# Patient Record
Sex: FEMALE | Race: WHITE | Employment: FULL TIME | URBAN - METROPOLITAN AREA
[De-identification: names, ages, dates, MRNs, and addresses within clinical notes are randomized per-mention and may not be internally consistent; named-entity substitution may affect disease eponyms.]

---

## 2022-03-18 PROBLEM — E66.01 SEVERE OBESITY (HCC): Status: ACTIVE | Noted: 2019-03-04

## 2022-07-07 ENCOUNTER — HOSPITAL ENCOUNTER (EMERGENCY)
Dept: GENERAL RADIOLOGY | Age: 22
Discharge: HOME | End: 2022-07-10
Payer: COMMERCIAL

## 2022-07-07 ENCOUNTER — HOSPITAL ENCOUNTER (EMERGENCY)
Age: 22
Discharge: HOME | End: 2022-07-07
Payer: COMMERCIAL

## 2022-07-07 VITALS
SYSTOLIC BLOOD PRESSURE: 135 MMHG | DIASTOLIC BLOOD PRESSURE: 96 MMHG | BODY MASS INDEX: 33.74 KG/M2 | HEART RATE: 97 BPM | HEIGHT: 67 IN | RESPIRATION RATE: 18 BRPM | OXYGEN SATURATION: 98 % | WEIGHT: 215 LBS | TEMPERATURE: 98 F

## 2022-07-07 DIAGNOSIS — S92.251A CLOSED DISPLACED FRACTURE OF NAVICULAR BONE OF RIGHT FOOT, INITIAL ENCOUNTER: Primary | ICD-10-CM

## 2022-07-07 PROCEDURE — 73600 X-RAY EXAM OF ANKLE: CPT

## 2022-07-07 PROCEDURE — 99283 EMERGENCY DEPT VISIT LOW MDM: CPT

## 2022-07-07 PROCEDURE — 73620 X-RAY EXAM OF FOOT: CPT

## 2022-07-07 ASSESSMENT — ENCOUNTER SYMPTOMS
SHORTNESS OF BREATH: 0
RESPIRATORY NEGATIVE: 1
EYES NEGATIVE: 1
GASTROINTESTINAL NEGATIVE: 1

## 2022-07-07 ASSESSMENT — PAIN - FUNCTIONAL ASSESSMENT: PAIN_FUNCTIONAL_ASSESSMENT: 0-10

## 2022-07-07 NOTE — ED TRIAGE NOTES
Pt arrived via POV c/o right ankle pain after a steel tank rolled over there heel while at work about an hour ago.

## 2022-07-07 NOTE — ED PROVIDER NOTES
[facility] Emergency Department Provider Note                     PCP:                Jean Paul Hewitt MD               Age: 24 y.o. Sex: female           ICD-10-CM    1. Closed displaced fracture of navicular bone of right foot, initial encounter  S92.251A        DISPOSITION         There are no discharge medications for this patient. MDM  Number of Diagnoses or Management Options  Closed displaced fracture of navicular bone of right foot, initial encounter  Diagnosis management comments: Parental diagnosis: Ankle sprain, ankle fracture, foot sprain, foot fracture. Patient has avulsion of the navicular bone. Cortical edge make it appear not acute however the patient has significant tenderness over that spot so therefore we will treat as an acute avulsion fracture of the navicular bone. Rest ice compression elevation follow-up with orthopedics. Amount and/or Complexity of Data Reviewed  Tests in the radiology section of CPT®: ordered and reviewed    Risk of Complications, Morbidity, and/or Mortality  Presenting problems: moderate  Diagnostic procedures: moderate  Management options: moderate        Orders Placed This Encounter   Procedures    XR ANKLE RIGHT (2 VIEWS)    XR FOOT RIGHT (2 VIEWS)    CRUTCHES WITH INSTRUCTIONS    ACE Wrap, apply 3\"        Esther Layton, [address] [facility] [id number]    Schedule an appointment as soon as possible for a visit in 2 days      [facility] [phone]  Schedule an appointment as soon as possible for a visit in 2 days         Marcella Leos is a 24 y.o. female who presents to the Emergency Department with chief complaint of    Chief Complaint   Patient presents with    Foot Pain      69-year-old female complaining of right ankle and foot pain after a heavy steel tank rolled up on her ankle at work.   Patient is painful to bear weight. Patient states she has had multiple ankle sprains when she played sports. The history is provided by the patient. Foot Pain  This is a new problem. The current episode started 1 to 2 hours ago. The problem occurs constantly. The problem has not changed since onset. Pertinent negatives include no chest pain and no shortness of breath. The symptoms are aggravated by walking. The symptoms are relieved by rest. She has tried nothing for the symptoms. Review of Systems   Constitutional: Negative. Negative for activity change, appetite change, chills, fatigue and fever. HENT: Negative. Eyes: Negative. Respiratory: Negative. Negative for shortness of breath. Cardiovascular: Negative. Negative for chest pain. Gastrointestinal: Negative. Endocrine: Negative. Musculoskeletal: Negative. Neurological: Negative. Hematological: Negative. Psychiatric/Behavioral: Negative. All other systems reviewed and are negative. All other systems reviewed and are negative. No past medical history on file. No past surgical history on file. No family history on file. Social Connections:     Frequency of Communication with Friends and Family: Not on file    Frequency of Social Gatherings with Friends and Family: Not on file    Attends Oriental orthodox Services: Not on file    Active Member of Clubs or Organizations: Not on file    Attends Club or Organization Meetings: Not on file    Marital Status: Not on file        No Known Allergies     Vitals signs and nursing note reviewed. Patient Vitals for the past 4 hrs:   Temp Pulse Resp BP SpO2   07/07/22 0507 98 °F (36.7 °C) 97 18 (!) 135/96 98 %          Physical Exam  Vitals and nursing note reviewed. Constitutional:       Appearance: Normal appearance. She is not ill-appearing. HENT:      Head: Normocephalic and atraumatic.       Right Ear: External ear normal.      Left Ear: External ear normal.      Nose: Nose normal.   Eyes:      Extraocular Movements: Extraocular movements intact. Conjunctiva/sclera: Conjunctivae normal.      Pupils: Pupils are equal, round, and reactive to light. Cardiovascular:      Rate and Rhythm: Normal rate and regular rhythm. Pulses: Normal pulses. Pulmonary:      Effort: Pulmonary effort is normal. No respiratory distress. Abdominal:      General: There is no distension. Musculoskeletal:         General: No swelling or signs of injury. Normal range of motion. Cervical back: Normal range of motion. No rigidity. Right ankle: Tenderness present. Normal range of motion. Right foot: Tenderness and bony tenderness present. Legs:    Skin:     General: Skin is warm and dry. Capillary Refill: Capillary refill takes less than 2 seconds. Neurological:      General: No focal deficit present. Mental Status: She is alert and oriented to person, place, and time. Mental status is at baseline. Psychiatric:         Mood and Affect: Mood normal.         Behavior: Behavior normal.         Thought Content: Thought content normal.         Judgment: Judgment normal.          Procedures    Labs Reviewed - No data to display     XR ANKLE RIGHT (2 VIEWS)   Final Result      1. No acute fracture or dislocation. XR FOOT RIGHT (2 VIEWS)   Final Result      1. Dorsal avulsion fracture of the navicular bone, appearing old/chronic given   the corticated margins. Correlate for focal pain in this region. 2. No additional fracture is seen.                  [place] Coma Scale  Eye Opening: Spontaneous  Best Verbal Response: Oriented  Best Motor Response: Obeys commands  Cassie Coma Scale Score: 15                     [place] Assessment  BP: (!) 135/96  Heart Rate: 97                 ED Course as of 07/07/22 0753   Thu Jul 07, 2022   0600 XR FOOT RIGHT (2 VIEWS) [JH]      ED Course User Index  [JH] Doug Lara MD        Voice dictation software was used during the making of this note. This software is not perfect and grammatical and other typographical errors may be present. This note has not been completely proofread for errors.         Chryl Schilder, MD  07/07/22 6768       Chryl Schilder, MD  07/07/22 3216

## 2022-07-07 NOTE — ED TRIAGE NOTES
I have reviewed discharge instructions with the patient. The patient verbalized understanding. Patient left ED via Discharge Method: ambulatory to Home with mother. Opportunity for questions and clarification provided. Patient given 0 scripts. To continue your aftercare when you leave the hospital, you may receive an automated call from our care team to check in on how you are doing. This is a free service and part of our promise to provide the best care and service to meet your aftercare needs.  If you have questions, or wish to unsubscribe from this service please call [phone]. Thank you for Choosing our [facility] Emergency Department.

## 2022-07-28 ENCOUNTER — OFFICE VISIT (OUTPATIENT)
Dept: ORTHOPEDIC SURGERY | Age: 22
End: 2022-07-28

## 2022-07-28 VITALS — WEIGHT: 204 LBS | BODY MASS INDEX: 32.02 KG/M2 | HEIGHT: 67 IN

## 2022-07-28 DIAGNOSIS — S99.921A INJURY OF RIGHT FOOT, INITIAL ENCOUNTER: ICD-10-CM

## 2022-07-28 DIAGNOSIS — S92.251A CLOSED AVULSION FRACTURE OF NAVICULAR BONE OF RIGHT FOOT, INITIAL ENCOUNTER: ICD-10-CM

## 2022-07-28 DIAGNOSIS — S99.911A INJURY OF RIGHT ANKLE, INITIAL ENCOUNTER: ICD-10-CM

## 2022-07-28 DIAGNOSIS — M79.671 RIGHT FOOT PAIN: Primary | ICD-10-CM

## 2022-07-28 NOTE — LETTER
[address],[facility] [address]  Phone: [phone]  Fax: [phone]    Tere Tracey MD        July 28, 2022     Patient: Chelsea Macias   YOB: 2000   Date of Visit: 7/28/2022       To Whom It May Concern: It is my medical opinion that Chelsea Macias:  Out of work for 4 weeks. If you have any questions or concerns, please don't hesitate to call.     Sincerely,        Tere Tracey MD

## 2022-07-28 NOTE — PROGRESS NOTES
The patient was prescribed a walker boot for the patient's right foot. The patient wears a size 9 shoe and I fitted them with a M size boot. The patient was fitted and instructed on the use of prescribed walker boot. I explained how to fit themselves and that the plastic flexible piece should always be on the front of the boot and secured by the Velcro straps on top. The air bladder in the boot was adjusted according to proper fit and comfort. The patient walked a short distance and acknowledged satisfaction with current fit. I also explained that they need a heel lift or a higher heeled shoe for the uninvolved LE to help normalize gait and avoid excessive low back stress/strain due to leg length inequality created from walker boot. Patient read and signed documenting they understand and agree to [facility]'s current DME return policy.

## 2022-07-28 NOTE — LETTER
DME Patient Authorization Form    Name: Renée Good  : 2000  MRN: [id number]   Age: 24 y.o. Gender: female  Delivery Address: [facility]     Diagnosis:     ICD-10-CM    1. Right foot pain  M79.671 XR ANKLE RIGHT (MIN 3 VIEWS)     XR FOOT RIGHT (2 VIEWS)     Atha Saint ()     Scooter      2. Injury of right ankle, initial encounter  S99.911A Atha Saint ()     Scooter      3. Injury of right foot, initial encounter  [address] [facility] ()     Scooter      4. Closed avulsion fracture of navicular bone of right foot, initial encounter  [address] ()     Scooter           Requested DME:  Atha Saint -  ($290.00) X 1 - right        Clinical Notes:     **Indicates non-covered items by insurance. Payment expected on date of service. Electronically signed by  Provider: Antoinette Galvez. Eulalio Box MD  ______________________________ Date: 2022                             [facility] Tax ID # [id number]        Durable Medical Equipment and/or Orthotics Patient Consent     I understand that my physician has prescribed this medical supply as part of my treatment plan as a matter of Medical Necessity.  I understand that I have a choice in where I receive my prescribed orthopedic supplies and/or services.  I authorize [facility] to furnish this service/product and to provide my insurance carrier with any information requested in order to process for payment.  I instruct my insurance carrier to pay [facility] directly for these services/products.  I understand that my insurance carrier may deny payment for this supply because it is a non-covered item, deemed not medically necessary or considered experimental.   I understand that any cost not covered by my insurance carrier will be solely my financial responsibility.    I have received the Supplier Standards and have reviewed them.  I have received the prescribed item and have been fully instructed on the proper use of the above services/products.    ______ (Patient Initials) I understand that all DME items are non-returnable after being dispensed. Items still in sealed packaging may be returned up to 14 days after purchasing. [address] will replace items that are defective.    ______ (Patient Initials) I understand that [facility] will not file a claim with my insurance carrier for this service/product and I am waiving my right to file a claim on my own for this service/product with my insurance company as this item is NON-COVERED (Denoted by the **) by my Insurance company/policy. ______ (Patient Initials) I understand that I am responsible to bring my equipment to the hospital for any surgery. ______________________________________________  ________________________  Patient / [place] [facility]            Thank you for considering [address] Your physician has prescribed specific medical equipment or devices for your home use. The following describes your rights and responsibilities as our customer. Right to Choose Providers: You have a choice regarding which company supplies your home medical equipment and devices, and to consult your physician in this decision. You may choose a medical supply store, a home medical equipment provider, or a specialist such as [facility]. [facility] will coordinate with your physician to provide the medical equipment or devices prescribed for your home use. Right to Service:  You have the right to considerate, respectful and nondiscriminatory care. You have the right to receive accurate and easily understood information about your health care.   If you speak a foreign language, or don't understand the discussions, assistance will be provided to allow you to make informed health care decisions. You have the right to know your treatment options and to participate in decisions about your care, including the right to accept or refuse treatment. You have the right to expect a reasonable response to your requests for treatment or service. You have the right to talk in confidence with health care providers and to have your health care information protected. You have the right to receive an explanation of your bill. You have the right to complain about the service or product you receive. Patient Responsibilities:  Please provide complete and accurate information about your health insurance benefits and make arrangements for the timely payment of your bill. [facility] will, if possible, assume responsibility for billing your insurance (Medicare, Medicaid and commercial) for the prescribed equipment or devices. If your policy does not cover the prescribed product, or only covers a portion of the bill, you are responsible for any remaining balance. Return and Exchange Policy:  [facility] will honor published  Warranties for products. [facility] will accept returns or exchanges within 14 days from the date of receipt, providin) the product must be in new condition; 2) receipt as required; and 3) used disposable and hygiene products may only be returned due to a defective product. Note: Refunds will be issued in a timely manner, please allow 4-6 weeks for processing. Complaint Procedures and DME Consumer Protection Resources:  [facility] values you as a customer, and is committed to resolving patient concerns. This commitment includes understanding and documenting your concerns, conducting a review of internal procedures, and providing you with an explanation and resolution to your concerns.   Should you have any questions about our services or billing process, please contact our office at (practice phone number). If we are unable to resolve the concern, you have the right to direct comments to the office of Consumer Protection, in the [address] or the [facility] office, without fear of repercussion. DMEPOS SUPPLIER STANDARDS    A supplier must be in compliance with all applicable Federal and [facility] [employer] and regulatory requirements. A supplier must provide complete and accurate information on the DMEPOS supplier application. Any changes to this information must be reported to the [facility] [email or web address] within 30 days. An authorized individual (one whose signature is binding) must sign the application for billing privileges. A supplier must fill orders from its own inventory, or must contract with other companies for the purchase of items necessary to fill the order. A supplier may not contract with any entity that is currently excluded from the Medicare program, any [facility] program, or from any other Federal procurement or Nonprocurement programs. A supplier must advise beneficiaries that they may rent or purchase inexpensive or routinely purchased durable medical equipment, and of the purchase option for capped rental equipment. A supplier must notify beneficiaries of warranty coverage and honor all warranties under applicable State Law, and repair or replace free of charge Medicare covered items that are under warranty. A supplier must maintain a physical facility on an appropriate site. A supplier must permit CMS, or its agents to conduct on-site inspections to ascertain the supplier's compliance with these standards. The supplier location must be accessible to beneficiaries during reasonable business hours, and must maintain a visible sign and posted hours of operation.   A supplier must maintain a primary business telephone listed under the name of the business in a [employer] or a toll free number available through [facility] assistance. The exclusive use of a beeper, answering machine or cell phone is prohibited. A supplier must have comprehensive liability insurance in the amount of at least $300,000 that covers both the supplier's place of business and all customers and employees of the supplier. If the supplier manufactures its own items, this insurance must also cover product liability and completed operations. A supplier must agree not to initiate telephone contact with beneficiaries, with a few exceptions allowed. This standard prohibits suppliers from calling beneficiaries in order to solicit new business. A supplier is responsible for delivery and must instruct beneficiaries on use of Medicare covered items, and maintain proof of delivery. A supplier must answer questions, and respond to complaints of the beneficiaries, and maintain documentation of such contacts. A supplier must maintain and replace at no charge or repair directly, or through a service contract with another company, Medicare covered items it has rented to beneficiaries. A supplier must accept returns of substandard (less than full quality for the particular item) or unsuitable items (inappropriate for the beneficiary at the time it was fitted and rented or sold) from beneficiaries. A supplier must disclose these supplier standards to each beneficiary to whom it supplies a Medicare-covered item. A supplier must disclose to the government any person having ownership, financial, or control interest in the supplier. A supplier must not convey or reassign a supplier number; i.e., the supplier may not sell or allow another entity to use its Medicare billing number. A supplier must have a complaint resolution protocol established to address beneficiary complaints that relate to these standards. A record of these complaints must be maintained at the physical facility.   Complaint records must include: the name, address, telephone number and health insurance claim number of the beneficiary, a summary of the complaint, and any action taken to resolve it. A supplier must agree to furnish CMS any information required by the Medicare statute and implementing regulations. A supplier of DMEPOS and other items and services must be accredited by a CMS-approved accreditation organization in order to receive and retain a supplier billing number. The accreditation must indicate the specific products and services, for which the supplier is accredited in order for the supplier to receive payment for those specific products and services. A DMEPOS supplier must notify their accreditation organization when a new DMEPOS location is opened. The accreditation organization may accredit the new supplier location for three months after it is operational without requiring a new site visit. All DMEPOS supplier locations, whether owned or subcontracted, must meet the [employer] and be separately accredited in order to bill Medicare. An accredited supplier may be denied enrollment or their enrollment may be revoked, if CMS determines that they are not in compliance with the DMEPOS quality standards. A DMEPOS supplier must disclose upon enrollment all products and services, including the addition of new product lines for which they are seeking accreditation. If a new product line is added after enrollment, the DMEPOS supplier will be responsible for notifying the accrediting body of the new product so that the DMEPOS supplier can be re-surveyed and accredited for these new products. Must meet the surety bond requirements specified in 42 C. F.R. 424.57(c). Implementation date- May 4, 2009. A supplier must obtain oxygen from a state-licensed oxygen supplier. A supplier must maintain ordering and referring documentation consistent with provisions found in 42 C. F.R. 424.516(f).   DMEPOS suppliers are prohibited from sharing a practice location with certain other Medicare providers and suppliers. DMEPOS suppliers must remain open to the public for a minimum of 30 hours per week with certain exceptions.

## 2022-07-28 NOTE — PROGRESS NOTES
Name: Curly Shin  YOB: 2000  Gender: female  MRN: [id number]    CC: Right foot pain    HPI:   07/07/2022: Injury at work. She describes the mechanism of injury with a 500 pound tank hitting the back of her ankle forcing her foot forward. 07/07/2022: [facility] reflects history of heavy tank rolling on her ankle/foot at work. Reports multiple ankle sprains in the past playing sports: Diagnosed closed displaced fracture navicular right foot but referenced radiologic reading of XR    07/28/2022: She presents today with persistent ankle/hindfoot pain    ROS/Meds/PSH/PMH/FH/SH: reviewed today    Tobacco:  reports that she has been smoking cigarettes. She has never used smokeless tobacco.     Physical Examination:  Patient appears to be alert and oriented with acceptable appearance. No obvious distress or SOB  CV: appears to have acceptable vascular color and capillary refill  Neuro:appears to have mostly intact light touch sensation   Skin: Mild ankle to hindfoot soft tissue swelling; resolving bruise dorsal 2-4 MTP  MS: No standing or gait exam  Right = lateral ankle to hindfoot area pain; mild if any medial ankle pain; no TMT pain  Right = protects but does have good ankle/foot motion; protects but has 5/5 strength    XR: Right side: Standing AP lateral mortise ankle plus AP oblique foot taken today with dorsal talar fracture with questionable superimposed os naviculare; no body fracture noted of the navicular, cuboid, talus head or neck, calcaneus  XR Impression:  As above      Reviewed Test/Records/Documents:  07/07/2022: [facility] ankle x-ray radiologic impression: No acute fracture or dislocation  07/07/2022: X-rays right foot 3 view radiologic impression:   1. Dorsal avulsion fracture of the navicular bone, appearing old/chronic given the cortical margins. Correlate for pain in this region.   2.  No additional fractures seen     Assessment:    Right posterior ankle injury, Chopart injury, dorsal navicular avulsion fracture    Plan:   The patient and I discussed the above assessment. We explored treatment options. She had a significant sprain/injury to her Chopart joint. I imagine that with her mechanism of injury, she had a plantarflexion injury to Chopart joint with resultant dorsal navicular fracture  The rounded area may represent os naviculare but the acute avulsion sleeve fracture portion can be clearly seen on x-ray  I discussed with them today the distinct possibility of stress fractures around the talus navicular cuboid and calcaneus  At this time, I see no radiographic collapse of except for the change in the dorsal navicular    Advanced medical imaging: If no resolution or improvement on return, MRI scan right ankle and foot    DME: Placed in 3D boot  We discussed ankle and foot care and NWB vs. limited 3D boot protection  PT: Not at this time  Orthotic/prosthetic: Potential future for custom insoles       Medication - OTC meds prn:     Surgical discussion: No indication specifically for surgery today    Follow up: 2 weeks: X-rays foot and ankle standing  Work status: Out of work for anticipated 6 weeks  History and discussion of management with: female     This note was created using Dragon voice recognition software which may result in errors of speech and spelling recognition and word/phrase syntax errors.

## 2022-08-11 ENCOUNTER — OFFICE VISIT (OUTPATIENT)
Dept: ORTHOPEDIC SURGERY | Age: 22
End: 2022-08-11

## 2022-08-11 DIAGNOSIS — S99.921D INJURY OF RIGHT FOOT, SUBSEQUENT ENCOUNTER: ICD-10-CM

## 2022-08-11 DIAGNOSIS — M79.671 RIGHT FOOT PAIN: Primary | ICD-10-CM

## 2022-08-11 DIAGNOSIS — S99.911D INJURY OF RIGHT ANKLE, SUBSEQUENT ENCOUNTER: ICD-10-CM

## 2022-08-11 DIAGNOSIS — S92.251G: ICD-10-CM

## 2022-08-11 NOTE — LETTER
[address],[facility] [address]  Phone: [phone]  Fax: [phone]    Zohaib Reeder MD        August 11, 2022     Patient: Yelitza Garza   YOB: 2000   Date of Visit: 8/11/2022       To Whom It May Concern: It is my medical opinion that Yelitza Garza Work status: Out of work for anticipated 4 weeks    If you have any questions or concerns, please don't hesitate to call.     Sincerely,        Zohaib Reeder MD

## 2022-08-11 NOTE — PROGRESS NOTES
Name: Dylan Davis  YOB: 2000  Gender: female  MRN: [id number]    07/28/2022: Initial visit with me for: Right foot pain  08/11/2022: She returns with persistent dorsal lateral foot pain    HPI:   07/07/2022: Injury at work. She describes the mechanism of injury with a 500 pound tank hitting the back of her ankle forcing her foot forward. 07/07/2022: [facility] reflects history of heavy tank rolling on her ankle/foot at work. Reports multiple ankle sprains in the past playing sports: Diagnosed closed displaced fracture navicular right foot but referenced radiologic reading of XR    07/28/2022: She presents today with persistent ankle/hindfoot pain    ROS/Meds/PSH/PMH/FH/SH: reviewed today    Tobacco:  reports that she has been smoking cigarettes. She has never used smokeless tobacco.     Physical Examination:  Patient appears to be alert and oriented with acceptable appearance. No obvious distress or SOB  CV: appears to have acceptable vascular color and capillary refill  Neuro:appears to have mostly intact light touch sensation   Skin: Mild ankle to hindfoot soft tissue swelling   MS: She is standing and full weightbearing in 3D boot  Right = lateral ankle to hindfoot area pain; mild if any medial ankle pain; no TMT pain  Right = protects but does have good ankle/foot motion; protects but has 5/5 strength    XR: Right side: Standing AP lateral mortise ankle plus AP oblique foot taken today with dorsal talar fracture; no body fracture noted of the navicular, cuboid, talus head or neck, calcaneus  XR Impression:  As above      Reviewed Test/Records/Documents:  07/07/2022: [facility] ankle x-ray radiologic impression: No acute fracture or dislocation  07/07/2022: X-rays right foot 3 view radiologic impression:   1. Dorsal avulsion fracture of the navicular bone, appearing old/chronic given the cortical margins. Correlate for pain in this region.   2.  No additional fractures seen     Assessment:    Right posterior ankle injury, Chopart injury, dorsal navicular avulsion fracture    Plan:   The patient and I discussed the above assessment. We explored treatment options. Due to my suspicion of a Chopart joint injury and her continued pain, I recommend MRI scan     Advanced medical imaging: Right ankle MRI scan: Assess Chopart joint injury, dorsal avulsion navicular fracture but please ensure no body fracture; assess anterior calcaneal process fracture versus bifurcate ligament injury    We discussed ankle and foot care and 3D boot protection  PT: Not at this time  Orthotic/prosthetic: Potential future for custom insoles       Medication - OTC meds prn:     Surgical discussion: No indication specifically for surgery today    Follow up: After MRI scan  Work status: Out of work for anticipated 4 weeks  History and discussion of management with: female     This note was created using Dragon voice recognition software which may result in errors of speech and spelling recognition and word/phrase syntax errors.

## 2022-08-19 ENCOUNTER — CLINICAL DOCUMENTATION (OUTPATIENT)
Dept: ORTHOPEDIC SURGERY | Age: 22
End: 2022-08-19

## 2022-08-29 DIAGNOSIS — S99.911D INJURY OF RIGHT ANKLE, SUBSEQUENT ENCOUNTER: ICD-10-CM

## 2022-08-29 DIAGNOSIS — S99.921D INJURY OF RIGHT FOOT, SUBSEQUENT ENCOUNTER: ICD-10-CM

## 2022-08-29 DIAGNOSIS — M79.671 RIGHT FOOT PAIN: ICD-10-CM

## 2022-08-29 DIAGNOSIS — S92.251G: ICD-10-CM

## 2022-08-30 ENCOUNTER — OFFICE VISIT (OUTPATIENT)
Dept: ORTHOPEDIC SURGERY | Age: 22
End: 2022-08-30

## 2022-08-30 VITALS — HEIGHT: 67 IN | BODY MASS INDEX: 31.95 KG/M2

## 2022-08-30 DIAGNOSIS — S99.921D INJURY OF RIGHT FOOT, SUBSEQUENT ENCOUNTER: ICD-10-CM

## 2022-08-30 DIAGNOSIS — S92.251G: Primary | ICD-10-CM

## 2022-08-30 NOTE — LETTER
[address],[facility] [address]  Phone: [phone]  Fax: [phone]    Dennis Vallejo MD        August 30, 2022     Patient: Brodie Pelletier   YOB: 2000   Date of Visit: 8/30/2022       To Whom It May Concern: It is my medical opinion that Dylan Davis Work status: Out of work for anticipated 4 weeks    If you have any questions or concerns, please don't hesitate to call.     Sincerely,        Dennis Vallejo MD

## 2022-08-30 NOTE — PROGRESS NOTES
Name: Toy Cameron  YOB: 2000  Gender: female  MRN: [id number]    07/28/2022: Initial visit with me for: Right foot pain  08/11/2022: Last visit with me  08/30/2020: She is here to discuss her MRI scan    HPI:   07/07/2022: Injury at work. She describes the mechanism of injury with a 500 pound tank hitting the back of her ankle forcing her foot forward. 07/07/2022: [facility] ER reflects history of heavy tank rolling on her ankle/foot at work. Reports multiple ankle sprains in the past playing sports: Diagnosed closed displaced fracture navicular right foot but referenced radiologic reading of XR    07/28/2022: She presents today with persistent ankle/hindfoot pain    ROS/Meds/PSH/PMH/FH/SH: reviewed today    Tobacco:  reports that she has been smoking cigarettes. She has never used smokeless tobacco.     Physical Examination:  Patient appears to be alert and oriented with acceptable appearance. No obvious distress or SOB  CV: appears to have acceptable vascular color and capillary refill  Neuro:appears to have mostly intact light touch sensation   Skin: Mild ankle to hindfoot soft tissue swelling   MS: She is standing and full weightbearing in 3D boot  Right = lateral ankle to hindfoot area pain; mild if any medial ankle pain; no TMT pain  Right = much better ankle/foot motion; 5/5 strength    XR: Right side: Standing AP lateral mortise ankle plus AP oblique foot taken 08/11/2022 with dorsal talar fracture; no body fracture noted of the navicular, cuboid, talus head or neck, calcaneus  XR Impression:  As above      Reviewed Test/Records/Documents:  07/07/2022: [facility] ankle x-ray radiologic impression: No acute fracture or dislocation  07/07/2022: X-rays right foot 3 view radiologic impression:   1. Dorsal avulsion fracture of the navicular bone, appearing old/chronic given the cortical margins. Correlate for pain in this region.   2.  No additional fractures seen    08/26/2022: Right ankle MRI scan without contrast: Radiologic impression of Dr. Yamil Berry:  1. Fracture anterior calcaneal process, contusion and anterior lateral calcaneus  2. Contusion lateral pole navicular consistent with midfoot injury  3. Midfoot ligament strain  4. Under findings section Dr. Yamil Berry mentions nondisplaced fracture with acute marrow edema anterior calcaneal process and plantar anterior medial calcaneus along the calcaneocuboid articulation. Patchy marrow edema of the lateral pole the navicular. 5.  Under findings section Dr. Yamil Berry states evidence of strain of the midfoot calcaneonavicular, calcaneocuboid and dorsal calcaneocuboid ligaments    Assessment:    Right ankle/foot Chopart injury, anterior calcaneal process and lateral pole navicular fracture  Right hindfoot ligament strain    Plan:   The patient and I discussed the above assessment. We explored treatment options. Her MRI scan confirms this Chopart injury with both navicular and anterior calcaneal process fractures    We discussed ankle and foot care and continued 3D boot protection  PT: Potential future  Orthotic/prosthetic: Potential future for custom insoles       Medication - OTC meds prn:     Surgical discussion: No indication for surgery today    Follow up: 4 weeks: X-rays ankle and foot standing  Work status: Out of work for anticipated 4 weeks  History and discussion of management with: female     This note was created using Dragon voice recognition software which may result in errors of speech and spelling recognition and word/phrase syntax errors.

## 2022-09-27 ENCOUNTER — OFFICE VISIT (OUTPATIENT)
Dept: ORTHOPEDIC SURGERY | Age: 22
End: 2022-09-27

## 2022-09-27 DIAGNOSIS — S99.911D INJURY OF RIGHT ANKLE, SUBSEQUENT ENCOUNTER: ICD-10-CM

## 2022-09-27 DIAGNOSIS — S92.251G: Primary | ICD-10-CM

## 2022-09-27 DIAGNOSIS — M79.671 RIGHT FOOT PAIN: ICD-10-CM

## 2022-09-27 DIAGNOSIS — S99.921D INJURY OF RIGHT FOOT, SUBSEQUENT ENCOUNTER: ICD-10-CM

## 2022-09-27 NOTE — LETTER
[address],[facility] [address]  Phone: [phone]  Fax: [phone]    Dennis Vallejo MD        September 27, 2022     Patient: Brodie Pelletier   YOB: 2000   Date of Visit: 9/27/2022       To Whom It May Concern: It is my medical opinion that Dylan Davis Work Status : Regular work beginning 09/28/2022  If you have any questions or concerns, please don't hesitate to call.     Sincerely,        Dennis Vallejo MD

## 2022-09-27 NOTE — PROGRESS NOTES
Name: Johnie Spears  YOB: 2000  Gender: female  MRN: [id number]    07/28/2022: Initial visit with me for: Right foot pain  08/11/2022: Last visit with me  08/30/2020: We discussed her MRI scan  09/27/2022: She reports feeling much better. She is out of the boot in regular Croc shoes    HPI:   07/07/2022: Injury at work. She describes the mechanism of injury with a 500 pound tank hitting the back of her ankle forcing her foot forward. 07/07/2022: [facility] ER reflects history of heavy tank rolling on her ankle/foot at work. Reports multiple ankle sprains in the past playing sports: Diagnosed closed displaced fracture navicular right foot but referenced radiologic reading of XR    07/28/2022: She presents today with persistent ankle/hindfoot pain    ROS/Meds/PSH/PMH/FH/SH: reviewed today    Tobacco:  reports that she has been smoking cigarettes. She has never used smokeless tobacco.     Physical Examination:  Patient appears to be alert and oriented with acceptable appearance. No obvious distress or SOB  CV: appears to have acceptable vascular color and capillary refill  Neuro:appears to have mostly intact light touch sensation   Skin: Right hindfoot thickening; no swelling  MS: Standing: Plantigrade: Gait full  Right = mild lateral anterior calcaneal process area pain; no dorsal navicular or midfoot pain  Right = good ankle/foot motion; 5/5 strength; no instability or crepitus    XR: Right side: Standing AP lateral mortise ankle plus AP oblique foot taken 08/11/2022 with dorsal talar fracture; no body fracture noted of the navicular, cuboid, talus head or neck, calcaneus  XR Impression:  As above      Reviewed Test/Records/Documents:  07/07/2022: [facility] ankle x-ray radiologic impression: No acute fracture or dislocation  07/07/2022: X-rays right foot 3 view radiologic impression:   1. Dorsal avulsion fracture of the navicular bone, appearing old/chronic given the cortical margins.   Correlate for pain in recognition and word/phrase syntax errors.

## 2022-09-27 NOTE — PROGRESS NOTES
Patient was fitted and instructed on a Reparel Ankle Sleeve for the right foot. The patient was prescribed a Wraptor brace for the patient's rightfoot. The patient wears a size 9 shoe and I fitted the patient with a M brace. I explained how to fit the brace properly by pulling the lace tabs across top of foot first then under arch and lastly pulling the strap up firmly and attaching to the lateral Velcro strip. Thus forming a figure 8 across the ankle joint. Once the figure 8 is completed they are to secure the top (short circumferential) straps to help avoid the straps from loosening with normal wear. The patient was able to demonstrate proper fitting in office to ensure compliance with device and acknowledged satisfaction with current fit. Patient read and signed documenting they understand and agree to [facility]'s current DME return policy.

## 2022-09-27 NOTE — LETTER
DME Patient Authorization Form    Name: Benson Sandhoff  : 2000  MRN: [id number]   Age: 20 y.o. Gender: female  Delivery Address: [facility]     Diagnosis:     ICD-10-CM    1. Closed avulsion fracture of navicular bone of right foot with delayed healing, subsequent encounter  S92.251G XR ANKLE RIGHT (MIN 3 VIEWS)     XR FOOT RIGHT (2 VIEWS)     Wraptor Ankle Brace ()     Reparel Ankle Sleeve ()      2. Injury of right foot, subsequent encounter  S99.921D XR ANKLE RIGHT (MIN 3 VIEWS)     XR FOOT RIGHT (2 VIEWS)     Wraptor Ankle Brace ()     Reparel Ankle Sleeve ()      3. Right foot pain  M79.671 XR ANKLE RIGHT (MIN 3 VIEWS)     XR FOOT RIGHT (2 VIEWS)     Wraptor Ankle Brace ()     Reparel Ankle Sleeve ()      4. Injury of right ankle, subsequent encounter  S99.911D XR ANKLE RIGHT (MIN 3 VIEWS)     XR FOOT RIGHT (2 VIEWS)     Wraptor Ankle Brace ()     Reparel Ankle Sleeve ()           Requested DME:  Reparel Ankle Sleeve**AANKL ($30) X 1 - right  Wraptor Ankle Brace - -64 ($129.00) X 1 - right        Clinical Notes:     **Indicates non-covered items by insurance. Payment expected on date of service. Electronically signed by  Provider: Consuelo Mohan MD__Date: 2022                            [facility]/[address] Tax ID # [id number]        Durable Medical Equipment and/or Orthotics Patient Consent     I understand that my physician has prescribed this medical supply as part of my treatment plan as a matter of Medical Necessity.  I understand that I have a choice in where I receive my prescribed orthopedic supplies and/or services.  I authorize [facility] to furnish this service/product and to provide my insurance carrier with any information requested in order to process for payment.    I instruct my insurance carrier to pay [facility] directly for these services/products.  I understand that my insurance carrier may deny payment for this supply because it is a non-covered item, deemed not medically necessary or considered experimental.   I understand that any cost not covered by my insurance carrier will be solely my financial responsibility.  I have received the Supplier Standards and have reviewed them.  I have received the prescribed item and have been fully instructed on the proper use of the above services/products.    ______ (Patient Initials) I understand that all DME items are non-returnable after being dispensed. Items still in sealed packaging may be returned up to 14 days after purchasing. [address] will replace items that are defective.    ______ (Patient Initials) I understand that [facility] will not file a claim with my insurance carrier for this service/product and I am waiving my right to file a claim on my own for this service/product with my insurance company as this item is NON-COVERED (Denoted by the **) by my Insurance company/policy. ______ (Patient Initials) I understand that I am responsible to bring my equipment to the hospital for any surgery. ______________________________________________  ________________________  Patient / Italo Ivan            Thank you for considering [address] Your physician has prescribed specific medical equipment or devices for your home use. The following describes your rights and responsibilities as our customer. Right to Choose Providers: You have a choice regarding which company supplies your home medical equipment and devices, and to consult your physician in this decision. You may choose a medical supply store, a home medical equipment provider, or a specialist such as [facility].   [facility] will coordinate with your physician to provide the medical equipment or devices prescribed for your home use. Right to Service:  You have the right to considerate, respectful and nondiscriminatory care. You have the right to receive accurate and easily understood information about your health care. If you speak a foreign language, or don't understand the discussions, assistance will be provided to allow you to make informed health care decisions. You have the right to know your treatment options and to participate in decisions about your care, including the right to accept or refuse treatment. You have the right to expect a reasonable response to your requests for treatment or service. You have the right to talk in confidence with health care providers and to have your health care information protected. You have the right to receive an explanation of your bill. You have the right to complain about the service or product you receive. Patient Responsibilities:  Please provide complete and accurate information about your health insurance benefits and make arrangements for the timely payment of your bill. [facility] will, if possible, assume responsibility for billing your insurance (Medicare, Medicaid and commercial) for the prescribed equipment or devices. If your policy does not cover the prescribed product, or only covers a portion of the bill, you are responsible for any remaining balance. Return and Exchange Policy:  [facility] will honor published  Warranties for products. [facility] will accept returns or exchanges within 14 days from the date of receipt, providin) the product must be in new condition; 2) receipt as required; and 3) used disposable and hygiene products may only be returned due to a defective product. Note: Refunds will be issued in a timely manner, please allow 4-6 weeks for processing.    Complaint Procedures and DME Consumer Protection Resources:  [facility] values you as a with these standards. The supplier location must be accessible to beneficiaries during reasonable business hours, and must maintain a visible sign and posted hours of operation. A supplier must maintain a primary business telephone listed under the name of the business in a [employer] or a toll free number available through directory assistance. The exclusive use of a beeper, answering machine or cell phone is prohibited. A supplier must have comprehensive liability insurance in the amount of at least $300,000 that covers both the supplier's place of business and all customers and employees of the supplier. If the supplier manufactures its own items, this insurance must also cover product liability and completed operations. A supplier must agree not to initiate telephone contact with beneficiaries, with a few exceptions allowed. This standard prohibits suppliers from calling beneficiaries in order to solicit new business. A supplier is responsible for delivery and must instruct beneficiaries on use of Medicare covered items, and maintain proof of delivery. A supplier must answer questions, and respond to complaints of the beneficiaries, and maintain documentation of such contacts. A supplier must maintain and replace at no charge or repair directly, or through a service contract with another company, Medicare covered items it has rented to beneficiaries. A supplier must accept returns of substandard (less than full quality for the particular item) or unsuitable items (inappropriate for the beneficiary at the time it was fitted and rented or sold) from beneficiaries. A supplier must disclose these supplier standards to each beneficiary to whom it supplies a Medicare-covered item. A supplier must disclose to the government any person having ownership, financial, or control interest in the supplier.   A supplier must not convey or reassign a supplier number; i.e., the supplier may not sell or allow another entity to use its Medicare billing number. A supplier must have a complaint resolution protocol established to address beneficiary complaints that relate to these standards. A record of these complaints must be maintained at the physical facility. Complaint records must include: the name, address, telephone number and health insurance claim number of the beneficiary, a summary of the complaint, and any action taken to resolve it. A supplier must agree to furnish CMS any information required by the Medicare statute and implementing regulations. A supplier of DMEPOS and other items and services must be accredited by a CMS-approved accreditation organization in order to receive and retain a supplier billing number. The accreditation must indicate the specific products and services, for which the supplier is accredited in order for the supplier to receive payment for those specific products and services. A DMEPOS supplier must notify their accreditation organization when a new DMEPOS location is opened. The accreditation organization may accredit the new supplier location for three months after it is operational without requiring a new site visit. All DMEPOS supplier locations, whether owned or subcontracted, must meet the [employer] and be separately accredited in order to bill Medicare. An accredited supplier may be denied enrollment or their enrollment may be revoked, if CMS determines that they are not in compliance with the DMEPOS quality standards. A DMEPOS supplier must disclose upon enrollment all products and services, including the addition of new product lines for which they are seeking accreditation. If a new product line is added after enrollment, the DMEPOS supplier will be responsible for notifying the accrediting body of the new product so that the DMEPOS supplier can be re-surveyed and accredited for these new products.   Must meet the surety bond requirements specified in

## 2022-11-08 ENCOUNTER — OFFICE VISIT (OUTPATIENT)
Dept: ORTHOPEDIC SURGERY | Age: 22
End: 2022-11-08

## 2022-11-08 DIAGNOSIS — M79.671 RIGHT FOOT PAIN: ICD-10-CM

## 2022-11-08 DIAGNOSIS — S99.921D INJURY OF RIGHT FOOT, SUBSEQUENT ENCOUNTER: ICD-10-CM

## 2022-11-08 DIAGNOSIS — S99.911D INJURY OF RIGHT ANKLE, SUBSEQUENT ENCOUNTER: ICD-10-CM

## 2022-11-08 DIAGNOSIS — S92.251G: Primary | ICD-10-CM

## 2022-11-08 NOTE — PROGRESS NOTES
Name: Ally Brown  YOB: 2000  Gender: female  MRN: [id number]    07/28/2022: Initial visit with me for: Right foot pain  08/11/2022: Office visit  08/30/2020: We discussed her MRI scan  09/27/2022: She reported feeling much better. She was out of the boot in regular [employer] shoes  11/08/2022: She is back to regular work and regular activity in regular shoes     HPI:   07/07/2022: Injury at work. She describes the mechanism of injury with a 500 pound tank hitting the back of her ankle forcing her foot forward. 07/07/2022: [address] [facility] reflects history of heavy tank rolling on her ankle/foot at work. Reports multiple ankle sprains in the past playing sports: Diagnosed closed displaced fracture navicular right foot but referenced radiologic reading of XR    07/28/2022: She presented with persistent ankle/hindfoot pain    ROS/Meds/PSH/PMH/FH/SH: reviewed today    Tobacco:  reports that she has been smoking cigarettes. She has never used smokeless tobacco.     Physical Examination:  Patient appears to be alert and oriented with acceptable appearance. No obvious distress or SOB  CV: appears to have acceptable vascular color and capillary refill  Neuro:appears to have mostly intact light touch sensation   Skin: Right hindfoot thickening; no swelling  MS: Standing: Plantigrade: Gait full  Right = no reproducible pain; full ankle/foot motion; 5/5 strength; no instability or crepitus    XR: Right side: Standing AP lateral mortise ankle plus AP oblique foot taken 08/11/2022 with dorsal talar fracture; no body fracture noted of the navicular, cuboid, talus head or neck, calcaneus  XR Impression:  As above      Reviewed Test/Records/Documents:  07/07/2022: [address] ankle x-ray radiologic impression: No acute fracture or dislocation  07/07/2022: X-rays right foot 3 view radiologic impression:   1. Dorsal avulsion fracture of the navicular bone, appearing old/chronic given the cortical margins.   Correlate for pain in this region. 2.  No additional fractures seen    2022: Right ankle MRI scan without contrast: Radiologic impression of Dr. Elizabeth Sen:  1. Fracture anterior calcaneal process, contusion and anterior lateral calcaneus  2. Contusion lateral pole navicular consistent with midfoot injury  3. Midfoot ligament strain  4. Under findings section Dr. Elizabeth Sen mentions nondisplaced fracture with acute marrow edema anterior calcaneal process and plantar anterior medial calcaneus along the calcaneocuboid articulation. Patchy marrow edema of the lateral pole the navicular. 5.  Under findings section Dr. Elizabeth Sen states evidence of strain of the midfoot calcaneonavicular, calcaneocuboid and dorsal calcaneocuboid ligaments  Her MRI scan confirms Chopart injury with both navicular and anterior calcaneal process fractures    Assessment:    Right ankle/foot Chopart injury, anterior calcaneal process and lateral pole navicular fracture  Right hindfoot ligament strain, dorsal talar avulsion fracture     Plan:   The patient and I discussed the above assessment. We explored treatment options. She is doing remarkably well. Despite her MRI scan findings involving the Chopart injuries to the anterior calcaneal process and lateral pole navicular, her radiographs remain stable and clinically she is doing really well  She reports her only discomfort is occasionally plantar flexing her foot to put on her jumpsuit or with prolonged activity she may have lateral foot pain. That pain is short-lived and does not limit or inhibit her activity or work    She has full mobility and strength so no indication for PT   She has an ankle brace but is in regular shoes today. I feel she is at [facility] [address] [place] [address] and can be safely discharged.   No indication for surgery, chronic medication or custom insoles    Ratin% Right lower extremity    Work: Regular    This note was created using Dragon voice recognition software which may result in errors of speech and spelling recognition and word/phrase syntax errors.